# Patient Record
(demographics unavailable — no encounter records)

---

## 2024-11-06 NOTE — HISTORY OF PRESENT ILLNESS
[FreeTextEntry1] : Follow up  [de-identified] : 69 year old female presents for a follow up  Has Hyperlipidemia- on Rosuvastatin  H/o DM II- well controlled, last A1C 6.2 from May 2024  Has HTN- on Losartan- HCTZ Obese- s/p sleeve gastrectomy in August 2019 was tried on Metformin by her bariatric surgeon to help with weight loss but she stopped the medication secondary from chills  exercise had been limited due to her chronic back pain and arthritis  was started on Mounjaro 2.5 mg weekly- has lost 18 pounds she reports her diet has improved she also wants to start walking for exercise   she has h/o iron deficiency - on iron supplementation  has been following with GI- up to date with colonoscopy  had been referred for CT enterography has GERD- on Pantoprazole   Has PAULINO- on CPAP  up to date with Flu vaccine up to date with COVID booster vaccine

## 2024-12-31 NOTE — HISTORY OF PRESENT ILLNESS
[Y] : Patient is sexually active [N] : Patient denies prior pregnancies [Menarche Age: ____] : age at menarche was [unfilled] [Patient reported bone density results were normal] : Patient reported bone density results were normal [No] : Patient does not have concerns regarding sex [Previously active] : previously active [Mammogramdate] : 10/10/2024 [TextBox_19] : Br2 [PapSmeardate] : 01/20/2024 [TextBox_31] : Negative - [BoneDensityDate] : 2024 [TextBox_37] : Patient states normal results [ColonoscopyDate] : 2024 [TextBox_43] : Patient states polyp removed otherwise normal results [HPVDate] : 01/20/2024 [TextBox_78] : Negative - [LMPDate] : 2009 [PGHxTotal] : 0 [FreeTextEntry1] : 2009